# Patient Record
(demographics unavailable — no encounter records)

---

## 2024-11-07 NOTE — PHYSICAL EXAM
[NL] : warm, clear [Erythematous Oropharynx] : erythematous oropharynx [Enlarged Tonsils] : tonsils not enlarged [Vesicles] : no vesicles [Exudate] : no exudate [Ulcerative Lesions] : no ulcerative lesions [Palate petechiae] : palate without petechiae [Wheezing] : no wheezing [Rales] : no rales [Tachypnea] : no tachypnea [Rhonchi] : no rhonchi

## 2024-11-07 NOTE — REVIEW OF SYSTEMS
[Nasal Congestion] : nasal congestion [Sore Throat] : sore throat [Congestion] : congestion [Negative] : Genitourinary [Tachypnea] : not tachypneic [Wheezing] : no wheezing [Cough] : no cough

## 2024-11-07 NOTE — DISCUSSION/SUMMARY
[FreeTextEntry1] : Discussed with parent viral pharyngitis with URI. Rapid strep negative Throat culture sent to rule out strep.  If positive, will start Amoxicillin 500 mg tabs- 1 tablet BID x 10 days Recommend supportive care with Tylenol or Motrin for fever or discomfort, increased fluids, salt water gargles, throat lozenges, tea with honey, cool mist humidifier Return to office if symptoms worsen or persist

## 2024-11-07 NOTE — HISTORY OF PRESENT ILLNESS
[de-identified] : Congestion and sore throat x1 day. Getting worse per pt. No fevers.  [FreeTextEntry6] : Sore throat and congestion x 1 day Headache today No fever. No cough, no SOB, difficulty breathing, chest pain, wheeze or stridor. No nausea, vomiting, diarrhea No abdominal pain, rash. No body aches or fatigue. Good po/urine output/bm. Normal sleep and activity No sick contacts Albuterol use during football season, was going to try Flovent to see if need for Albuterol decreases but has not taken since not exercising currently

## 2025-04-09 NOTE — HISTORY OF PRESENT ILLNESS
[de-identified] : S/T, cough, nasal congestion, body aches, fatigue, afebrile, no N/V/D/C [FreeTextEntry6] : ST, cough and congestion x 3-4 days, body aches, HA. No vomiting , diarrhea or fevers.

## 2025-04-09 NOTE — REVIEW OF SYSTEMS
[Fever] : no fever [Malaise] : malaise [Headache] : headache [Ear Pain] : no ear pain [Nasal Congestion] : nasal congestion [Sore Throat] : sore throat [Cough] : cough [Shortness of Breath] : no shortness of breath [Myalgia] : myalgia [Negative] : Skin

## 2025-06-04 NOTE — HISTORY OF PRESENT ILLNESS
[de-identified] : as per pt feeling lightheaded lately getting wore x 2 weeks even when just laying down happened when doing tack warm ups decreased appetite and as per pta staying hydrated  [FreeTextEntry6] : dizzy lightheaded - feels tired (as usual) has some loss of appetite  sleeping ok - never a great sleeper feels lightheaded when laying down - not eating much (doent feel like eating due to feeling off) and feels lightheaded at track  no headhache last cycle heavier than usual

## 2025-06-04 NOTE — HISTORY OF PRESENT ILLNESS
[de-identified] : as per pt feeling lightheaded lately getting wore x 2 weeks even when just laying down happened when doing tack warm ups decreased appetite and as per pta staying hydrated  [FreeTextEntry6] : dizzy lightheaded - feels tired (as usual) has some loss of appetite  sleeping ok - never a great sleeper feels lightheaded when laying down - not eating much (doent feel like eating due to feeling off) and feels lightheaded at track  no headhache last cycle heavier than usual